# Patient Record
Sex: FEMALE | Employment: STUDENT | URBAN - METROPOLITAN AREA
[De-identification: names, ages, dates, MRNs, and addresses within clinical notes are randomized per-mention and may not be internally consistent; named-entity substitution may affect disease eponyms.]

---
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
## 2021-11-30 ENCOUNTER — HOSPITAL ENCOUNTER (OUTPATIENT)
Dept: MRI IMAGING | Age: 21
Discharge: HOME | End: 2021-11-30
Attending: PHYSICIAN ASSISTANT
Payer: COMMERCIAL

## 2021-11-30 DIAGNOSIS — H90.3 ASYMMETRIC SNHL (SENSORINEURAL HEARING LOSS): ICD-10-CM

## 2021-11-30 DIAGNOSIS — R42 DIZZINESS: ICD-10-CM

## 2021-11-30 PROCEDURE — 74011250636 HC RX REV CODE- 250/636: Performed by: PHYSICIAN ASSISTANT

## 2021-11-30 PROCEDURE — A9576 INJ PROHANCE MULTIPACK: HCPCS | Performed by: PHYSICIAN ASSISTANT

## 2021-11-30 PROCEDURE — 70553 MRI BRAIN STEM W/O & W/DYE: CPT

## 2021-11-30 RX ORDER — SODIUM CHLORIDE 0.9 % (FLUSH) 0.9 %
10 SYRINGE (ML) INJECTION
Status: COMPLETED | OUTPATIENT
Start: 2021-11-30 | End: 2021-11-30

## 2021-11-30 RX ADMIN — GADOTERIDOL 18 ML: 279.3 INJECTION, SOLUTION INTRAVENOUS at 19:54

## 2021-11-30 RX ADMIN — Medication 10 ML: at 19:54

## 2022-01-26 ENCOUNTER — HOSPITAL ENCOUNTER (OUTPATIENT)
Dept: INFUSION THERAPY | Age: 22
Discharge: HOME | End: 2022-01-26
Payer: COMMERCIAL

## 2022-01-26 VITALS
SYSTOLIC BLOOD PRESSURE: 127 MMHG | DIASTOLIC BLOOD PRESSURE: 73 MMHG | RESPIRATION RATE: 18 BRPM | OXYGEN SATURATION: 100 % | HEART RATE: 100 BPM | TEMPERATURE: 98.3 F

## 2022-01-26 PROCEDURE — 96365 THER/PROPH/DIAG IV INF INIT: CPT

## 2022-01-26 PROCEDURE — 74011250636 HC RX REV CODE- 250/636: Performed by: FAMILY MEDICINE

## 2022-01-26 PROCEDURE — 74011250636 HC RX REV CODE- 250/636: Performed by: OPHTHALMOLOGY

## 2022-01-26 RX ORDER — SODIUM CHLORIDE 9 MG/ML
25 INJECTION, SOLUTION INTRAVENOUS ONCE
Status: COMPLETED | OUTPATIENT
Start: 2022-01-26 | End: 2022-01-26

## 2022-01-26 RX ORDER — HYDROCORTISONE SODIUM SUCCINATE 100 MG/2ML
100 INJECTION, POWDER, FOR SOLUTION INTRAMUSCULAR; INTRAVENOUS AS NEEDED
Status: ACTIVE | OUTPATIENT
Start: 2022-01-26 | End: 2022-01-26

## 2022-01-26 RX ORDER — ALBUTEROL SULFATE 0.83 MG/ML
2.5 SOLUTION RESPIRATORY (INHALATION) AS NEEDED
Status: ACTIVE | OUTPATIENT
Start: 2022-01-26 | End: 2022-01-26

## 2022-01-26 RX ORDER — EPINEPHRINE 1 MG/ML
0.3 INJECTION, SOLUTION, CONCENTRATE INTRAVENOUS AS NEEDED
Status: ACTIVE | OUTPATIENT
Start: 2022-01-26 | End: 2022-01-26

## 2022-01-26 RX ORDER — ACETAMINOPHEN 325 MG/1
650 TABLET ORAL AS NEEDED
Status: ACTIVE | OUTPATIENT
Start: 2022-01-26 | End: 2022-01-26

## 2022-01-26 RX ORDER — ONDANSETRON 2 MG/ML
8 INJECTION INTRAMUSCULAR; INTRAVENOUS AS NEEDED
Status: ACTIVE | OUTPATIENT
Start: 2022-01-26 | End: 2022-01-26

## 2022-01-26 RX ORDER — DIPHENHYDRAMINE HYDROCHLORIDE 50 MG/ML
50 INJECTION, SOLUTION INTRAMUSCULAR; INTRAVENOUS AS NEEDED
Status: ACTIVE | OUTPATIENT
Start: 2022-01-26 | End: 2022-01-26

## 2022-01-26 RX ADMIN — FERRIC CARBOXYMALTOSE INJECTION 750 MG: 50 INJECTION, SOLUTION INTRAVENOUS at 09:02

## 2022-01-26 RX ADMIN — SODIUM CHLORIDE 25 ML/HR: 9 INJECTION, SOLUTION INTRAVENOUS at 09:02

## 2022-01-26 NOTE — PROGRESS NOTES
Arrived to the [facility]. Injectafer completed. Patient tolerated without problesm. Any issues or concerns during appointment: no.  Patient aware of next infusion appointment on 2/3/22 (date) at 1500 (time). Patient instructed to call provider with temperature of 100.4 or greater or nausea/vomiting/ diarrhea or pain not controlled by medications  Discharged ambulatory.

## 2022-02-03 ENCOUNTER — HOSPITAL ENCOUNTER (OUTPATIENT)
Dept: INFUSION THERAPY | Age: 22
Discharge: HOME | End: 2022-02-03
Payer: COMMERCIAL

## 2022-02-03 VITALS
SYSTOLIC BLOOD PRESSURE: 116 MMHG | TEMPERATURE: 98 F | HEART RATE: 99 BPM | OXYGEN SATURATION: 100 % | DIASTOLIC BLOOD PRESSURE: 66 MMHG | RESPIRATION RATE: 16 BRPM

## 2022-02-03 PROBLEM — R26.89 IMBALANCE: Status: ACTIVE | Noted: 2022-02-03

## 2022-02-03 PROBLEM — R42 VERTIGO: Status: ACTIVE | Noted: 2022-02-03

## 2022-02-03 PROBLEM — R51.9 CHRONIC DAILY HEADACHE: Status: ACTIVE | Noted: 2022-02-03

## 2022-02-03 PROCEDURE — 74011250636 HC RX REV CODE- 250/636: Performed by: FAMILY MEDICINE

## 2022-02-03 PROCEDURE — 96365 THER/PROPH/DIAG IV INF INIT: CPT

## 2022-02-03 RX ORDER — SODIUM CHLORIDE 0.9 % (FLUSH) 0.9 %
10 SYRINGE (ML) INJECTION AS NEEDED
Status: DISCONTINUED | OUTPATIENT
Start: 2022-02-03 | End: 2022-02-05 | Stop reason: HOSPADM

## 2022-02-03 RX ADMIN — FERRIC CARBOXYMALTOSE INJECTION 750 MG: 50 INJECTION, SOLUTION INTRAVENOUS at 15:59

## 2022-02-03 RX ADMIN — Medication 10 ML: at 16:03

## 2022-02-03 NOTE — PROGRESS NOTES
Arrived to the [facility]. Injectafer completed. Patient tolerated well. Any issues or concerns during appointment: none. Patient instructed to call provider with temperature of 100.4 or greater or nausea/vomiting/ diarrhea or pain not controlled by medications  Discharged ambulatory.

## 2022-02-23 PROBLEM — G93.5 CHIARI I MALFORMATION (HCC): Status: ACTIVE | Noted: 2022-02-23

## 2022-02-23 PROBLEM — G43.009 MIGRAINE WITHOUT AURA AND WITHOUT STATUS MIGRAINOSUS, NOT INTRACTABLE: Status: ACTIVE | Noted: 2022-02-23

## 2022-02-23 PROBLEM — G44.83 PRIMARY COUGH HEADACHE: Status: ACTIVE | Noted: 2022-02-23

## 2022-03-19 PROBLEM — G44.83 PRIMARY COUGH HEADACHE: Status: ACTIVE | Noted: 2022-02-23

## 2022-03-19 PROBLEM — G93.5 CHIARI I MALFORMATION (HCC): Status: ACTIVE | Noted: 2022-02-23

## 2022-03-19 PROBLEM — G43.009 MIGRAINE WITHOUT AURA AND WITHOUT STATUS MIGRAINOSUS, NOT INTRACTABLE: Status: ACTIVE | Noted: 2022-02-23

## 2022-03-20 PROBLEM — R26.89 IMBALANCE: Status: ACTIVE | Noted: 2022-02-03

## 2022-03-20 PROBLEM — R51.9 CHRONIC DAILY HEADACHE: Status: ACTIVE | Noted: 2022-02-03

## 2022-03-20 PROBLEM — R42 VERTIGO: Status: ACTIVE | Noted: 2022-02-03

## 2022-05-31 ENCOUNTER — TELEMEDICINE (OUTPATIENT)
Dept: NEUROLOGY | Age: 22
End: 2022-05-31
Payer: COMMERCIAL

## 2022-05-31 DIAGNOSIS — G43.009 MIXED COMMON MIGRAINE AND MUSCLE CONTRACTION HEADACHE: Primary | ICD-10-CM

## 2022-05-31 DIAGNOSIS — G44.209 MIXED COMMON MIGRAINE AND MUSCLE CONTRACTION HEADACHE: Primary | ICD-10-CM

## 2022-05-31 PROCEDURE — 99214 OFFICE O/P EST MOD 30 MIN: CPT | Performed by: PSYCHIATRY & NEUROLOGY

## 2022-05-31 RX ORDER — TIZANIDINE 2 MG/1
TABLET ORAL
Qty: 60 TABLET | Refills: 3 | Status: SHIPPED | OUTPATIENT
Start: 2022-05-31

## 2022-05-31 RX ORDER — TOPIRAMATE 50 MG/1
50 TABLET, FILM COATED ORAL 2 TIMES DAILY
COMMUNITY

## 2022-05-31 NOTE — PROGRESS NOTES
.Mary Moy (:  2000) is a Established patient, here for evaluation of the following:    Assessment & Plan   Below is the assessment and plan developed based on review of pertinent history, physical exam, labs, studies, and medications. 1. Mixed common migraine and muscle contraction headache  -     tiZANidine (ZANAFLEX) 2 MG tablet; 1 qhs, may increase to 2 qhs in 2 weeks. , Disp-60 tablet, R-3Normal  Topamax 50 mg twice a day worked well and kept her free from migraines. However, she noticed oligomenorrhea while on Topamax. She will check to her gynecologist.  We have discussed monthly CGRP blocker ajovy as a backup. She is already on antidepressant. Start Zanaflex low dosage for daily tension headaches. No follow-ups on file. Subjective   Increased topamax 50 mg bid, no migraines, but still has pressure pain, frontal and the top, 4-5/10 usually in the every evening, not taking pain killers daily anymore. BP during HA normal to mildly high 118/64, 123/87, 137/93. Sleep test done past year negative. Looking down like reading for a period of time, she would have neck pain. Noticed less amount of MC bleeding and shorter period, and missed one past month. Headache     Migraine       Review of Systems   Neurological: Positive for headaches. Objective   Patient-Reported Vitals  No data recorded     Physical Exam  Constitutional:       Appearance: She is obese. Eyes:      Extraocular Movements: Extraocular movements intact. Conjunctiva/sclera: Conjunctivae normal.      Pupils: Pupils are equal, round, and reactive to light. Pulmonary:      Effort: Pulmonary effort is normal.   Neurological:      Mental Status: She is oriented to person, place, and time. Cranial Nerves: No cranial nerve deficit. Motor: No weakness.       Coordination: Coordination normal.      Gait: Gait normal.   Psychiatric:         Mood and Affect: Mood normal.         Thought Content: Thought content normal.         Judgment: Judgment normal.       [INSTRUCTIONS:  \"[x]\" Indicates a positive item  \"[]\" Indicates a negative item  -- DELETE ALL ITEMS NOT EXAMINED]    Constitutional: [x] Appears well-developed and well-nourished [x] No apparent distress      [] Abnormal -     Mental status: [x] Alert and awake  [x] Oriented to person/place/time [x] Able to follow commands    [] Abnormal -     Eyes:   EOM    [x]  Normal    [] Abnormal -   Sclera  [x]  Normal    [] Abnormal -          Discharge [x]  None visible   [] Abnormal -     HENT: [x] Normocephalic, atraumatic  [] Abnormal -   [x]     External Ears [x] Normal hearing [] Abnormal -    Neck: [x][] Abnormal -     Pulmonary/Chest: [x] Respiratory effort normal   [x] No visualized signs of difficulty breathing or respiratory distress        [] Abnormal -      Musculoskeletal:   [x] Normal gait with no signs of ataxia         [x] Normal range of motion of neck        [] Abnormal -     Neurological:        [x] No Facial Asymmetry (Cranial nerve 7 motor function) (limited exam due to video visit)          [x] No gaze palsy        [] Abnormal -          Skin:        [x]         [] Abnormal -            Psychiatric:       [x] Normal Affect [] Abnormal -        [x] No Hallucinations    Other pertinent observable physical exam findings:-        On this date 5/31/2022 I have spent 35 minutes reviewing previous notes, test results and face to face (virtual) with the patient discussing the diagnosis and importance of compliance with the treatment plan as well as documenting on the day of the visit. Mark Mccracken, was evaluated through a synchronous (real-time) audio-video encounter. The patient (or guardian if applicable) is aware that this is a billable service, which includes applicable co-pays. This Virtual Visit was conducted with patient's (and/or legal guardian's) consent.  The visit was conducted pursuant to the emergency declaration under the [address] [facility], [facility] waiver authority and the [employer] General Act. Patient identification was verified, and a caregiver was present when appropriate. The patient was located at Home: [address] [place] [address],[address] [id number].    Provider was located at Home ([facility]): Mak Turner MD

## 2022-08-31 ENCOUNTER — OFFICE VISIT (OUTPATIENT)
Dept: OBGYN CLINIC | Age: 22
End: 2022-08-31
Payer: COMMERCIAL

## 2022-08-31 VITALS
BODY MASS INDEX: 36.16 KG/M2 | DIASTOLIC BLOOD PRESSURE: 84 MMHG | WEIGHT: 225 LBS | SYSTOLIC BLOOD PRESSURE: 116 MMHG | HEIGHT: 66 IN

## 2022-08-31 DIAGNOSIS — Z01.411 ENCOUNTER FOR GYNECOLOGICAL EXAMINATION (GENERAL) (ROUTINE) WITH ABNORMAL FINDINGS: Primary | ICD-10-CM

## 2022-08-31 DIAGNOSIS — B37.2 YEAST DERMATITIS: ICD-10-CM

## 2022-08-31 DIAGNOSIS — N91.3 PRIMARY OLIGOMENORRHEA: ICD-10-CM

## 2022-08-31 DIAGNOSIS — Z12.4 PAP SMEAR FOR CERVICAL CANCER SCREENING: ICD-10-CM

## 2022-08-31 DIAGNOSIS — N93.9 ABNORMAL UTERINE BLEEDING (AUB): ICD-10-CM

## 2022-08-31 DIAGNOSIS — E28.8 HYPERANDROGENISM: ICD-10-CM

## 2022-08-31 PROCEDURE — 99213 OFFICE O/P EST LOW 20 MIN: CPT | Performed by: OBSTETRICS & GYNECOLOGY

## 2022-08-31 PROCEDURE — 99385 PREV VISIT NEW AGE 18-39: CPT | Performed by: OBSTETRICS & GYNECOLOGY

## 2022-08-31 ASSESSMENT — PATIENT HEALTH QUESTIONNAIRE - PHQ9
SUM OF ALL RESPONSES TO PHQ9 QUESTIONS 1 & 2: 2
SUM OF ALL RESPONSES TO PHQ QUESTIONS 1-9: 2
1. LITTLE INTEREST OR PLEASURE IN DOING THINGS: 1
SUM OF ALL RESPONSES TO PHQ QUESTIONS 1-9: 2
2. FEELING DOWN, DEPRESSED OR HOPELESS: 1
SUM OF ALL RESPONSES TO PHQ QUESTIONS 1-9: 2
SUM OF ALL RESPONSES TO PHQ QUESTIONS 1-9: 2

## 2022-08-31 NOTE — PROGRESS NOTES
[address], [place] [address], [address]  [phone]    Lg Arvizu MD, Thermon Lennox, MD, Ramona Collier, [facility]    Assessment/Plan     Diagnoses and all orders for this visit:    Encounter for gynecological examination (general) (routine) with abnormal findings  Comments:  - pap done  - s/p gardasil  - self breast awareness encouraged    Primary oligomenorrhea  Comments:  - checking labs and US  - normal TSH recently  - suspect PCOS, has clinical hyperandrogenism  Orders:  -     Testosterone, free, total; Future  -     DHEA-Sulfate; Future  -     17-OH Progesterone, LC/MS; Future  -     Follicle Stimulating Hormone; Future  -     Estradiol; Future  -     Prolactin; Future    Pap smear for cervical cancer screening  -     PAP LB, Reflex HPV ASCUS ([id number]); Future  -     PAP LB, Reflex HPV ASCUS ([id number])    Yeast dermatitis  Comments:  - will try topical mycolog  Orders:  -     nystatin-triamcinolone (MYCOLOG II) 313419-5.1 UNIT/GM-% cream; Apply topically 2 times daily x 1 week on the vulva    Hyperandrogenism  Comments:  - h/o hydradenitis, acne, and hirsutism  - will check testosterone, DHEAS, 17OHP    Abnormal uterine bleeding (AUB)  Comments:  - h/o oligomenorrhea. Likely episode of anovulation causing no menses x 3 months. - will w/u with US and labs as above  - not sexually active    BMI 36.0-36.9,adult         Subjective     Guichon Jeff Dukes  25 y.o. [facility] presents today for annual Gyn exam with c/o irregular menses. New patient. She has always had irregular periods, usually q 35 days but sometimes 28-35 days. This year, gained 20 lbs. She skipped a period in Jan, Feb, and March. Period lasts 2-4 days and is heavy and painful. Has needed to be out of school. Large clots, needs to sleep on a towel bc she will bleed through clothing. Has hydradenitis and is treated with minocyclin, seen by Dr. Imani Mckay. Also h/o acne and hirsutism (plucks chin hair). Was on OCPs in HS but didn't take regularly and seemed to make bleeding worse. She only tried this for 2-3 months. H/o migraines, no aura. No personal hx of heart/liver dz. No family hx of clotting d/o. Not sexually active, virginal. Reports no libido. Wonders if this is related to her antidepressants. Has itching on external vulva and dryness. No abnormal discharge. No fishy odor. LAST PAP: never    LAST MAMMO: n/a    LMP: Patient's last menstrual period was 2022.     BIRTH CONTROL: abstinence      OB History    Para Term  AB Living   0 0 0 0 0 0   SAB IAB Ectopic Molar Multiple Live Births   0 0 0 0 0 0           Past Medical History:   Diagnosis Date    [facility]     Hidradenitis     Imbalance 2/3/2022    Iron deficiency anemia     Major depressive disorder, single episode, unspecified     Migraines     Mild asthma     Unspecified hypothyroidism             Past Surgical History:   Procedure Laterality Date    OTHER SURGICAL HISTORY  2006    Oral surgery    TONSILLECTOMY      WISDOM TOOTH EXTRACTION  2016           Family History   Problem Relation Age of Onset    Depression Mother     Anxiety Disorder Mother     Diabetes type 2  Mother     Depression Father     Anxiety Disorder Father     Diabetes type 2  Maternal Grandmother     Breast Cancer Neg Hx     Uterine Cancer Neg Hx     Colon Cancer Neg Hx     Ovarian Cancer Neg Hx            Social History     Socioeconomic History    Marital status: Single     Spouse name: Not on file    Number of children: Not on file    Years of education: Not on file    Highest education level: Not on file   Occupational History    Not on file   Tobacco Use    Smoking status: Never    Smokeless tobacco: Never   Substance and Sexual Activity    Alcohol use: No    Drug use: Never    Sexual activity: Never   Other Topics Concern    Not on file   Social History Narrative    Abuse: Feels safe at home, no history of physical abuse, no Neurologic:  no focal deficits,normal coordination, muscle strength and tone     Skin:   intact without lesions or rashes     Cervical Nodes:  no significant adenopathy     Axillary Nodes:   no significant adenopathy     Psych:  alert and cooperative; normal mood and affect; normal attention span and concentration      Cesar Mujica MD   4:00 PM  08/31/22

## 2022-08-31 NOTE — PROGRESS NOTES
Pt is here today to discuss irregular periods. Menses Q month usually, but did not get period in Jan, Feb, and March 2022. Since April, Q 35 days, lasting 2 days, heavy those 2 days. Menarche age 8. Pt was on birth control pills in 2018 to help regulate her periods but states it did not help. Pt has gained 20 lbs this year. Hx hypothyroidism, managed by PCP. States she has been on the same dose of levothyroxine for the last year. Pt reports acne and hair growth. Hx hidradenitis, requiring surgery. Currently on minocycline. LAST PAP:  never    LAST MAMMO:  never    LMP:  Patient's last menstrual period was 08/16/2022.     BIRTH CONTROL:  none      FAMILY HISTORY OF:   Breast Cancer:  No   Ovarian Cancer:  No   Uterine Cancer:  No   Colon Cancer: No    Vitals:    08/31/22 1328   BP: 116/84   Site: Left Upper Arm   Position: Sitting   Weight: 225 lb (102.1 kg)   Height: 5' 6\" (1.676 m)       Aparna Cao NP, APRN - CNP  08/31/22  1:39 PM

## 2022-09-01 LAB
FSH SERPL-ACNC: 4.6 MIU/ML
PROLACTIN SERPL-MCNC: 16.4 NG/ML

## 2022-09-02 LAB
CYTOLOGIST CVX/VAG CYTO: NORMAL
CYTOLOGY CVX/VAG DOC THIN PREP: NORMAL
ESTRADIOL SERPL-MCNC: 74 PG/ML
HPV REFLEX: NORMAL
Lab: NORMAL
PATH REPORT.FINAL DX SPEC: NORMAL
STAT OF ADQ CVX/VAG CYTO-IMP: NORMAL

## 2022-09-04 LAB — 17OHP SERPL-MCNC: 68 NG/DL

## 2022-09-06 LAB
TESTOST FREE SERPL-MCNC: 1.3 PG/ML (ref 0–4.2)
TESTOST SERPL-MCNC: 5 NG/DL (ref 13–71)

## 2022-09-07 ENCOUNTER — OFFICE VISIT (OUTPATIENT)
Dept: OBGYN CLINIC | Age: 22
End: 2022-09-07
Payer: COMMERCIAL

## 2022-09-07 VITALS
WEIGHT: 229 LBS | BODY MASS INDEX: 36.8 KG/M2 | DIASTOLIC BLOOD PRESSURE: 80 MMHG | SYSTOLIC BLOOD PRESSURE: 120 MMHG | HEIGHT: 66 IN

## 2022-09-07 DIAGNOSIS — E28.2 PCOS (POLYCYSTIC OVARIAN SYNDROME): ICD-10-CM

## 2022-09-07 DIAGNOSIS — N91.3 PRIMARY OLIGOMENORRHEA: Primary | ICD-10-CM

## 2022-09-07 DIAGNOSIS — N93.9 ABNORMAL UTERINE BLEEDING (AUB): ICD-10-CM

## 2022-09-07 PROCEDURE — 99214 OFFICE O/P EST MOD 30 MIN: CPT | Performed by: OBSTETRICS & GYNECOLOGY

## 2022-09-07 PROCEDURE — 76856 US EXAM PELVIC COMPLETE: CPT | Performed by: OBSTETRICS & GYNECOLOGY

## 2022-09-07 RX ORDER — DESOGESTREL AND ETHINYL ESTRADIOL 0.15-0.03
1 KIT ORAL DAILY
Qty: 1 PACKET | Refills: 3 | Status: SHIPPED | OUTPATIENT
Start: 2022-09-07

## 2022-09-07 ASSESSMENT — PATIENT HEALTH QUESTIONNAIRE - PHQ9
SUM OF ALL RESPONSES TO PHQ QUESTIONS 1-9: 0
SUM OF ALL RESPONSES TO PHQ QUESTIONS 1-9: 0
2. FEELING DOWN, DEPRESSED OR HOPELESS: 0
SUM OF ALL RESPONSES TO PHQ QUESTIONS 1-9: 0
1. LITTLE INTEREST OR PLEASURE IN DOING THINGS: 0
SUM OF ALL RESPONSES TO PHQ9 QUESTIONS 1 & 2: 0
SUM OF ALL RESPONSES TO PHQ QUESTIONS 1-9: 0

## 2022-09-07 NOTE — PROGRESS NOTES
Patient comes in today for gyn follow up with ultrasound. LAST PAP:  08/31/2022, Negative    LAST MAMMO:  Never    LMP:  Patient's last menstrual period was 08/16/2022.     BIRTH CONTROL:  abstinence    TOBACCO USE:  No    FAMILY HISTORY OF:   Breast Cancer:  No   Ovarian Cancer:  No   Uterine Cancer:  No   Colon Cancer:  No    Vitals:    09/07/22 1506   BP: 120/80   Site: Left Upper Arm   Position: Sitting   Weight: 229 lb (103.9 kg)   Height: 5' 6\" (1.676 m)        Ashleigh Kaba MA  09/07/22  3:10 PM

## 2022-09-07 NOTE — PROGRESS NOTES
[address], [place] [address], [address]  [phone]    Anitra Nuñez MD, Meka Alcazar MD, OBIE Dugan-BC  Assessment/Plan     Vik Shah was seen today for follow-up and ultrasound. Diagnoses and all orders for this visit:    Primary oligomenorrhea  -     AMB POC US, PELVIC COMPLETE  -     desogestrel-ethinyl estradiol (DESOGEN) 0.15-30 MG-MCG per tablet; Take 1 tablet by mouth daily    PCOS (polycystic ovarian syndrome)  Comments:  - Meets [place] criteria for PCOS (oligomenorrhea, clinical hyperandrogenism, PCO ovary)  - DIscussed implications, she is agreeable to OCPs for cycle control, no CI to combined hormones. Risk of VTE and warning signs discussed. Has migraines, no aura. - Rx desogen given, will fu in 3-4 months to see how she is doing on new medication.   - asked if she needs to see an endocrinologist - discussed she meets criteria for PCOS and other hormones wnl (testosterone low but [address] low/estrogen high not concerning for hypogonadism, TSH wnl, prolactin wnl). Her internist also did not feel she needed to see endo. Abnormal uterine bleeding (AUB)  Comments:  - likely episode of anovulatory bleeding   Orders:  -     AMB POC US, PELVIC COMPLETE         Subjective     Mark Mensah Stacks 25 y.o. Heidi Jones presents today for a gyn problem of AUB (likely anovulatory bleeding) and a long hx of oligomenorrhea. Initially seen on 8/31/22 and reported h/o irreghular menses usually q 35 days but skipped x 3 months this past year. Having heavy painful periods. H/o hydradenitis and treated with abx by Dr. [facility]. Also h/o acne and hirsutism. Virginal.     Last visit given mycolog for itching on vulva and dryness, she has not filled this Rx yet.     Labs: 8/31/22  Testosterone 5, Free T 1.3  17OHP 68  FSH 4.6  E2 74  Prolactin 16.4  DHEAS P  TSH 4.504 (7/6/22)     Ultrasound today:   UTERUS IS ANTEVERTED AND APPEARS NORMAL WITHIN THE LIMITS OF TRANSABDOMINAL ULTRASOUND   ENDO APPEARS WNL   RIGHT OV APPEARS WNL- 10.506 cm3   LEFT OV APPEARS WNL- 8.818 cm3   NO FREE FLUID       LAST PAP: 22 neg    LAST MAMMO: N/A    LMP: Patient's last menstrual period was 2022.     BIRTH CONTROL: abstinence (virginal)      OB History    Para Term  AB Living   0 0 0 0 0 0   SAB IAB Ectopic Molar Multiple Live Births   0 0 0 0 0 0           Past Medical History:   Diagnosis Date    Chiari I malformation ([address]     Hidradenitis     Imbalance 2/3/2022    Iron deficiency anemia     Major depressive disorder, single episode, unspecified     Migraine 2019    Migraines     Mild asthma     Unspecified hypothyroidism             Past Surgical History:   Procedure Laterality Date    OTHER SURGICAL HISTORY  2006    Oral surgery    TONSILLECTOMY      WISDOM TOOTH EXTRACTION  2016           Family History   Problem Relation Age of Onset    Depression Mother     Anxiety Disorder Mother     Diabetes type 2  Mother     Mental Illness Mother     Preeclampsia Mother     Depression Father     Anxiety Disorder Father     Mental Illness Father     Diabetes type 2  Maternal Grandmother     Diabetes Maternal Grandmother     Breast Cancer Neg Hx     Uterine Cancer Neg Hx     Colon Cancer Neg Hx     Ovarian Cancer Neg Hx            Social History     Socioeconomic History    Marital status: Single     Spouse name: Not on file    Number of children: Not on file    Years of education: Not on file    Highest education level: Not on file   Occupational History    Not on file   Tobacco Use    Smoking status: Never    Smokeless tobacco: Never   Substance and Sexual Activity    Alcohol use: No    Drug use: Never    Sexual activity: Never   Other Topics Concern    Not on file   Social History Narrative    Abuse: Feels safe at home, no history of physical abuse, no history of sexual abuse     Social Determinants of Health     Financial Resource Strain: Not on file   Food Insecurity: Not on file   Transportation Needs: Not on file   Physical Activity: Not on file   Stress: Not on file   Social Connections: Not on file   Intimate Partner Violence: Not on file   Housing Stability: Not on file           No Known Allergies      Review of Systems    Constitutional:  No fevers or chills    Neuro:  No headaches, seizure activity    HEENT: no visual changes, bleeding gums    CV: No chest pain or palpitations    Resp: No SOB or cough    Breast:  No masses, pain, D/C, bleeding    GI:  No nausea/vomiting/diarrhea/constipation    : No dysuria or hematuria    MS:  No back, joint pain    Gyn:  As per HPI    Psych:  No depression, anxiety      Objective       Vitals:    09/07/22 1506   BP: 120/80         Physical Exam    General:  well developed, well nourished, in no acute distress     Head:   normocephalic and atraumatic     Msk:   no deformity or scoliosis noted with normal posture and gait     Skin:   intact without lesions or rashes     Psych:  alert and cooperative; normal mood and affect; normal attention span and concentration        Monique Beltran MD   10:16 AM  09/08/22

## 2022-09-08 PROBLEM — E28.2 PCOS (POLYCYSTIC OVARIAN SYNDROME): Status: ACTIVE | Noted: 2022-09-08

## 2022-09-18 LAB — DHEA-S SERPL-MCNC: NORMAL UG/DL

## 2022-09-22 ENCOUNTER — TELEPHONE (OUTPATIENT)
Dept: OBGYN CLINIC | Age: 22
End: 2022-09-22

## 2022-09-23 NOTE — TELEPHONE ENCOUNTER
Attempted to contact patient but call went straight to . [facility] for patient to call the office. No details given.  mc

## 2022-10-04 ENCOUNTER — TELEMEDICINE (OUTPATIENT)
Dept: NEUROLOGY | Age: 22
End: 2022-10-04
Payer: COMMERCIAL

## 2022-10-04 DIAGNOSIS — N91.3 PRIMARY OLIGOMENORRHEA: ICD-10-CM

## 2022-10-04 DIAGNOSIS — G93.5 CHIARI I MALFORMATION (HCC): ICD-10-CM

## 2022-10-04 DIAGNOSIS — G43.009 MIXED COMMON MIGRAINE AND MUSCLE CONTRACTION HEADACHE: Primary | ICD-10-CM

## 2022-10-04 DIAGNOSIS — G44.209 MIXED COMMON MIGRAINE AND MUSCLE CONTRACTION HEADACHE: Primary | ICD-10-CM

## 2022-10-04 DIAGNOSIS — G43.009 MIGRAINE WITHOUT AURA, NOT INTRACTABLE, WITHOUT STATUS MIGRAINOSUS: ICD-10-CM

## 2022-10-04 PROCEDURE — 99213 OFFICE O/P EST LOW 20 MIN: CPT | Performed by: PSYCHIATRY & NEUROLOGY

## 2022-10-04 RX ORDER — DESOGESTREL AND ETHINYL ESTRADIOL 0.15-0.03
1 KIT ORAL DAILY
Qty: 1 PACKET | Refills: 3 | OUTPATIENT
Start: 2022-10-04

## 2022-10-04 RX ORDER — RIZATRIPTAN BENZOATE 10 MG/1
TABLET ORAL
Qty: 10 TABLET | Refills: 5 | Status: SHIPPED | OUTPATIENT
Start: 2022-10-04

## 2022-10-04 NOTE — PROGRESS NOTES
Mark Simpson (:  2000) is a Established patient, here for evaluation of the following:    Assessment & Plan   Below is the assessment and plan developed based on review of pertinent history, physical exam, labs, studies, and medications. 1. Mixed common migraine and muscle contraction headache  -     MRI BRAIN W WO CONTRAST; Future  2. Migraine without aura, not intractable, without status migrainosus  -     rizatriptan (MAXALT) 10 MG tablet; [email or web address], May repeat in 2 hours if needed, max 2 tabs/ 24 hrs., Disp-10 tablet, R-5Normal  3. Chiari I malformation ([address]  -     MRI BRAIN W WO CONTRAST; Future    Daily HA reduced to 5/mo with topamax 50 mg bid. Continue topamax and start maxalt for acute migraines. MRI brain to   follow up Chiari, if unchanged will extend intervals. Return in about 6 months (around 2023). Subjective   Headaches improved, was daily, pressure pain when laughing or coughing, muscle relaxant sleepy side effect. Tolerated topamax well, migraine 5/mo,moderate, takes ibuprofen 2-3 tabs, or tough through. BP high sided. No palpitation. Review of Systems   Neurological:  Positive for headaches.          Objective   Patient-Reported Vitals  No data recorded     Physical Exam  [INSTRUCTIONS:  \"[x]\" Indicates a positive item  \"[]\" Indicates a negative item  -- DELETE ALL ITEMS NOT EXAMINED]    Constitutional: [x] Appears well-developed and well-nourished [x] No apparent distress      [] Abnormal -     Mental status: [x] Alert and awake  [x] Oriented to person/place/time [x] Able to follow commands, normal speech    [] Abnormal -     Eyes:   EOM    [x]  Normal    [] Abnormal -   Sclera  [x]  Normal    [] Abnormal -          Discharge [x]  None visible   [] Abnormal -     HENT: [x] Normocephalic, atraumatic  [] Abnormal -   [x]     External Ears [x] Normal hearing [] Abnormal -    Neck: [x]  [] Abnormal -     Pulmonary/Chest: [x] Respiratory effort normal   [x] No visualized signs of difficulty breathing or respiratory distress        [] Abnormal -      Musculoskeletal:   [x] Normal gait with no signs of ataxia         [x] Normal range of motion of neck        [] Abnormal -     Neurological:        [x] No Facial Asymmetry (Cranial nerve 7 motor function) (limited exam due to video visit)          [x] No gaze palsy        [] Abnormal -          Skin:        [x]          [] Abnormal -            Psychiatric:       [x] Normal Affect [] Abnormal -        [x] No Hallucinations    Other pertinent observable physical exam findings:-         On this date 10/4/2022 I have spent 20 minutes reviewing previous notes, test results and face to face (virtual) with the patient discussing the diagnosis and importance of compliance with the treatment plan as well as documenting on the day of the visit. Mark Gurrola, was evaluated through a synchronous (real-time) audio-video encounter. The patient (or guardian if applicable) is aware that this is a billable service, which includes applicable co-pays. This Virtual Visit was conducted with patient's (and/or legal guardian's) consent. The visit was conducted pursuant to the emergency declaration under the [address] [place] [address], [address] [place] [address] authority and the [employer] Act. Patient identification was verified, and a caregiver was present when appropriate. The patient was located at Other: car not driving .    Provider was located at Home ([address]): Craisa Narayan MD

## 2022-10-18 ENCOUNTER — HOSPITAL ENCOUNTER (OUTPATIENT)
Dept: MRI IMAGING | Age: 22
Discharge: HOME | End: 2022-10-21
Payer: COMMERCIAL

## 2022-10-18 DIAGNOSIS — G43.009 MIXED COMMON MIGRAINE AND MUSCLE CONTRACTION HEADACHE: ICD-10-CM

## 2022-10-18 DIAGNOSIS — G93.5 CHIARI I MALFORMATION (HCC): ICD-10-CM

## 2022-10-18 DIAGNOSIS — G44.209 MIXED COMMON MIGRAINE AND MUSCLE CONTRACTION HEADACHE: ICD-10-CM

## 2022-10-18 PROCEDURE — 2580000003 HC RX 258: Performed by: FAMILY MEDICINE

## 2022-10-18 PROCEDURE — A9579 GAD-BASE MR CONTRAST NOS,1ML: HCPCS | Performed by: FAMILY MEDICINE

## 2022-10-18 PROCEDURE — 6360000004 HC RX CONTRAST MEDICATION: Performed by: FAMILY MEDICINE

## 2022-10-18 PROCEDURE — 70553 MRI BRAIN STEM W/O & W/DYE: CPT

## 2022-10-18 RX ORDER — SODIUM CHLORIDE 0.9 % (FLUSH) 0.9 %
10 SYRINGE (ML) INJECTION AS NEEDED
Status: DISCONTINUED | OUTPATIENT
Start: 2022-10-18 | End: 2022-10-22 | Stop reason: HOSPADM

## 2022-10-18 RX ADMIN — SODIUM CHLORIDE, PRESERVATIVE FREE 10 ML: 5 INJECTION INTRAVENOUS at 18:51

## 2022-10-18 RX ADMIN — GADOTERIDOL 20 ML: 279.3 INJECTION, SOLUTION INTRAVENOUS at 18:50

## 2022-11-07 ENCOUNTER — APPOINTMENT (OUTPATIENT)
Dept: GENERAL RADIOLOGY | Age: 22
End: 2022-11-07
Payer: COMMERCIAL

## 2022-11-07 ENCOUNTER — HOSPITAL ENCOUNTER (EMERGENCY)
Age: 22
Discharge: HOME | End: 2022-11-07
Attending: EMERGENCY MEDICINE
Payer: COMMERCIAL

## 2022-11-07 VITALS
OXYGEN SATURATION: 100 % | HEIGHT: 66 IN | DIASTOLIC BLOOD PRESSURE: 80 MMHG | TEMPERATURE: 98.8 F | BODY MASS INDEX: 35.36 KG/M2 | SYSTOLIC BLOOD PRESSURE: 130 MMHG | WEIGHT: 220 LBS | HEART RATE: 95 BPM | RESPIRATION RATE: 18 BRPM

## 2022-11-07 DIAGNOSIS — R05.1 ACUTE COUGH: Primary | ICD-10-CM

## 2022-11-07 PROCEDURE — 71045 X-RAY EXAM CHEST 1 VIEW: CPT

## 2022-11-07 PROCEDURE — 99283 EMERGENCY DEPT VISIT LOW MDM: CPT

## 2022-11-07 RX ORDER — GUAIFENESIN/DEXTROMETHORPHAN 100-10MG/5
5 SYRUP ORAL 3 TIMES DAILY PRN
Qty: 45 ML | Refills: 0 | Status: SHIPPED | OUTPATIENT
Start: 2022-11-07 | End: 2022-11-07 | Stop reason: SDUPTHER

## 2022-11-07 RX ORDER — GUAIFENESIN/DEXTROMETHORPHAN 100-10MG/5
5 SYRUP ORAL 3 TIMES DAILY PRN
Qty: 45 ML | Refills: 0 | Status: SHIPPED | OUTPATIENT
Start: 2022-11-07 | End: 2022-11-17

## 2022-11-07 ASSESSMENT — PAIN - FUNCTIONAL ASSESSMENT
PAIN_FUNCTIONAL_ASSESSMENT: 0-10
PAIN_FUNCTIONAL_ASSESSMENT: 0-10

## 2022-11-07 ASSESSMENT — PAIN DESCRIPTION - LOCATION
LOCATION: HEAD
LOCATION: HEAD

## 2022-11-07 ASSESSMENT — ENCOUNTER SYMPTOMS
SORE THROAT: 0
BACK PAIN: 0
SHORTNESS OF BREATH: 0
VOMITING: 0
COUGH: 1
ABDOMINAL PAIN: 0
NAUSEA: 0

## 2022-11-07 ASSESSMENT — PAIN SCALES - GENERAL: PAINLEVEL_OUTOF10: 5

## 2022-11-07 NOTE — ED NOTES
I have reviewed discharge instructions with the patient. The patient verbalized understanding. Patient left ED via Discharge Method: ambulatory to Home with grandma    Opportunity for questions and clarification provided. Patient given 1 scripts. To continue your aftercare when you leave the hospital, you may receive an automated call from our care team to check in on how you are doing. This is a free service and part of our promise to provide the best care and service to meet your aftercare needs.  If you have questions, or wish to unsubscribe from this service please call [phone]. Thank you for Choosing our [facility] Emergency Department.         Jeanette Malone RN  11/07/22 6470

## 2022-11-07 NOTE — ED TRIAGE NOTES
\"I have the flu on Friday and I don't feel well. I have chiari and I have ICP.  Since i've been coughing a lot it's given me a headache\"

## 2022-11-07 NOTE — ED PROVIDER NOTES
[facility] Emergency Department Provider Note                   PCP:                Richard Campbell MD               Age: 25 y.o. Sex: female       ICD-10-CM    1. Acute cough  R05.1           DISPOSITION Decision To Discharge 11/07/2022 02:56:49 PM         Orders Placed This Encounter   Procedures    XR CHEST Donald Quintanilla is a 25 y.o. female who presents to the Emergency Department with chief complaint of    Chief Complaint   Patient presents with    Influenza    Headache      Patient is a 66-year-old female with past medical history significant for Arnold-Chiari malformation type I, she presents this department chief complaint of cough. She states she was diagnosed with flu last week. She was given a cough medication which she has not yet used, also was given steroids by urgent care last week. She reports continued cough. She states the cough is now aggravating her headache secondary to her Chiari malformation. She had an MRI performed about 1 month ago, showed stable malformation with no acute or new findings. She is no other medical complaints. She denies visual changes, chest pain, nausea or vomiting. The history is provided by the patient. No  was used. Review of Systems   Constitutional:  Negative for chills and fever. HENT:  Negative for congestion and sore throat. Respiratory:  Positive for cough. Negative for shortness of breath. Cardiovascular:  Negative for chest pain and palpitations. Gastrointestinal:  Negative for abdominal pain, nausea and vomiting. Genitourinary:  Negative for dysuria and vaginal discharge. Musculoskeletal:  Negative for back pain. Skin:  Negative for wound. Neurological:  Positive for headaches. Psychiatric/Behavioral:  Negative for agitation. All other systems reviewed and are negative.     Past Medical History:   Diagnosis Date    [facility]ari I malformation ([facility]     Hidradenitis     Imbalance 2/3/2022    Iron deficiency anemia     Major depressive disorder, single episode, unspecified     Migraine 2019    Migraines     Mild asthma     Unspecified hypothyroidism         Past Surgical History:   Procedure Laterality Date    OTHER SURGICAL HISTORY  2006    Oral surgery    TONSILLECTOMY      WISDOM TOOTH EXTRACTION  2016        Family History   Problem Relation Age of Onset    Depression Mother     Anxiety Disorder Mother     Diabetes type 2  Mother     Mental Illness Mother     Preeclampsia Mother     Depression Father     Anxiety Disorder Father     Mental Illness Father     Diabetes type 2  Maternal Grandmother     Diabetes Maternal Grandmother     Breast Cancer Neg Hx     Uterine Cancer Neg Hx     Colon Cancer Neg Hx     Ovarian Cancer Neg Hx         Social History     Socioeconomic History    Marital status: Single     Spouse name: None    Number of children: None    Years of education: None    Highest education level: None   Tobacco Use    Smoking status: Never    Smokeless tobacco: Never   Substance and Sexual Activity    Alcohol use: No    Drug use: Never    Sexual activity: Never   Social History Narrative    Abuse: Feels safe at home, no history of physical abuse, no history of sexual abuse         Patient has no known allergies.      Discharge Medication List as of 11/7/2022  3:17 PM        CONTINUE these medications which have NOT CHANGED    Details   rizatriptan (MAXALT) 10 MG tablet [email or web address], May repeat in 2 hours if needed, max 2 tabs/ 24 hrs., Disp-10 tablet, R-5Normal      desogestrel-ethinyl estradiol (DESOGEN) 0.15-30 MG-MCG per tablet Take 1 tablet by mouth daily, Disp-1 packet, R-3Normal      nystatin-triamcinolone (MYCOLOG II) 964892-3.1 UNIT/GM-% cream Apply topically 2 times daily x 1 week on the vulva, Disp-30 g, R-0, Normal      topiramate (TOPAMAX) 50 MG tablet Take 50 mg by mouth 2 times dailyHistorical Med      tiZANidine (ZANAFLEX) 2 MG tablet 1 qhs, may increase to 2 qhs in 2 weeks., Disp-60 tablet, R-3Normal      albuterol sulfate  (90 Base) MCG/ACT inhaler Inhale 1 puff into the lungs every 4 hours as neededHistorical Med      buPROPion (WELLBUTRIN SR) 150 MG extended release tablet Take 300 mg by mouth dailyHistorical Med      desvenlafaxine succinate (PRISTIQ) 25 MG TB24 extended release tablet 100 mg dailyHistorical Med      levothyroxine (SYNTHROID) 112 MCG tablet TAKE 1 TAB BY MOUTH DAILY (BEFORE BREAKFAST). Historical Med      minocycline (MINOCIN;DYNACIN) 50 MG capsule Take 50 mg by mouth 2 times dailyHistorical Med      omeprazole (PRILOSEC) 20 MG delayed release capsule TAKE 1 CAP BY MOUTH DAILY. Historical Med              Vitals signs and nursing note reviewed. Patient Vitals for the past 4 hrs:   Temp Pulse Resp BP SpO2   11/07/22 1520 -- 95 18 130/80 100 %   11/07/22 1219 98.8 °F (37.1 °C) 95 17 133/80 100 %          Physical Exam  Vitals and nursing note reviewed. Constitutional:       General: She is not in acute distress. Appearance: Normal appearance. She is not ill-appearing, toxic-appearing or diaphoretic. HENT:      Head: Normocephalic and atraumatic. Nose: Nose normal.      Mouth/Throat:      Mouth: Mucous membranes are moist.   Eyes:      Pupils: Pupils are equal, round, and reactive to light. Cardiovascular:      Rate and Rhythm: Normal rate. Pulmonary:      Effort: Pulmonary effort is normal. No respiratory distress. Abdominal:      General: Abdomen is flat. Palpations: Abdomen is soft. Tenderness: There is no abdominal tenderness. Musculoskeletal:         General: Normal range of motion. Cervical back: Normal range of motion. No rigidity. Skin:     General: Skin is warm. Neurological:      General: No focal deficit present. Mental Status: She is alert.    Psychiatric:         Mood and Affect: Mood normal.        MDM     Amount and/or Complexity of Data Reviewed  Tests in the radiology section of CPT®: ordered and reviewed  Independent visualization of images, tracings, or specimens: yes    Risk of Complications, Morbidity, and/or Mortality  Presenting problems: moderate  Diagnostic procedures: low  Management options: low    Patient Progress  Patient progress: stable      Procedures      Labs Reviewed - No data to display     XR CHEST PORTABLE   Final Result   No consolidation. Voice dictation software was used during the making of this note. This software is not perfect and grammatical and other typographical errors may be present. This note has not been completely proofread for errors.      MONALISA Stephens  11/07/22 1554

## 2022-11-07 NOTE — DISCHARGE INSTRUCTIONS
Chest x-ray clear. No focal findings. I have given you a cough medication. Use this sparingly. Follow-up with your primary care physician.

## 2022-11-08 ENCOUNTER — CARE COORDINATION (OUTPATIENT)
Dept: OTHER | Facility: CLINIC | Age: 22
End: 2022-11-08

## 2022-11-08 RX ORDER — OSELTAMIVIR PHOSPHATE 30 MG/1
30 CAPSULE ORAL 2 TIMES DAILY
COMMUNITY

## 2022-11-08 NOTE — CARE COORDINATION
[address] [facility] ED Follow Up Call    2022    Patient: Ritika Iglesias Patient : 2000   MRN: [id number]  Reason for Admission: cough  Discharge Date: 2022      Ambulatory Care Manager [facility]) contacted the patient by telephone to introduced the Associate Care Management Program r/t ED visit for cough. Verified name and  with patient as identifiers. Patient was agreeable Care Coordination, ACM reviewed and updated CC Protocol, medications, goals, education and disease specific assessment. Pt reports ongoing and worsening headache since discharge from ED. Pt messaged Neurologist and waiting return call/MyChart. Pt has Chiari I Malformation and neurology follows for this dx. Pt reports testing positive for the flu on Friday, . Pt cough is making headache worse and she reports \"trying not to cough\". Pt given order for Robitussin in ED and she reports she has medication at home and has been taking with no relief. Pt taking Topamax with no relief. Pt reports being out of Maxalt and plans to get filled but that it does not help much. Pt also took muscle relaxer last night but it makes her too tired to take during the day. Pt also reports taking Tamiflu. Offered to reach Neurologist but pt reports team is good about returning call with a few hours. Will route this encounter to Neurologist but if no response will call office around 1600. Pt agreeable to follow up contact from [facility]. Pt is in school Monday to Friday. Provided Education:  Discussed red flags and appropriate site of care based on symptoms and resources available to patient including: PCP  Specialist  When to call [phone]. Importance and benefits of: Follow up with PCP and specialist, medication adherence, self monitoring and reporting of symptoms. Plan:  Continue bi-weekly outreaches to provide telephonic support, education and resources as needed.    Discuss / follow up on: Goal progress, neurology follow up, medications filled, headache and cough symptoms, review all medications, and any arising needs     Pt verbalized understanding and is agreeable to follow up contact. Challenges to be reviewed by the provider   Additional needs identified to be addressed with provider Yes  Worsening headache                    Care Transitions ED Follow Up    Care Transitions Interventions  Schedule Follow Up Appointment with Physician: Raphael Nowak you have any ongoing symptoms?: Yes   Onset of Patient-reported symptoms:  In the past 7 days   Patient-reported symptoms: Other, Cough (Comment: Headache)   Did you call your PCP prior to going to the ED?: No - Did not call PCP   Are there any other complaints/concerns that you wish to tell your provider?: Headache unrelieved and worsening    Do you have a copy of your discharge instructions?: Yes   Do you understand what to report and when to return?: Yes   Are you following your discharge instructions?: Yes   Do you have all of your prescriptions and are they filled?: No   Have you scheduled your follow up appointment?: No   Were you discharged with any Home Care or Post Acute Services or do you currently have any active services?: No         Do you have any needs or concerns that I can assist you with?: Yes   Identified Barriers: Stress          Education Documentation  Educate reporting changes in condition, taught by Gregg Chapman RN at 11/8/2022 12:13 PM.  Learner: Patient  Readiness: Acceptance  Method: Explanation  Response: Verbalizes Understanding  Comment: DIscussed discharge instructions and ongoing concerns    Educate Patient on When to Call for Symptoms, taught by Gregg Chapman RN at 11/8/2022 12:13 PM.  Learner: Patient  Readiness: Acceptance  Method: Explanation  Response: Verbalizes Understanding  Comment: DIscussed discharge instructions and ongoing concerns    medication nonadherence risks, taught by Gregg Chapman RN at 11/8/2022 12:13 PM.  Learner: Patient  Readiness: Acceptance  Method: Explanation  Response: Verbalizes Understanding  Comment: DIscussed discharge instructions and ongoing concerns    Discharge Medications, taught by Denae Corona RN at 11/8/2022 12:13 PM.  Learner: Patient  Readiness: Acceptance  Method: Explanation  Response: Verbalizes Understanding  Comment: DIscussed discharge instructions and ongoing concerns    General medication information, taught by Denae Corona RN at 11/8/2022 12:13 PM.  Learner: Patient  Readiness: Acceptance  Method: Explanation  Response: Verbalizes Understanding  Comment: DIscussed discharge instructions and ongoing concerns    Education Comments  No comments found. Goals        Conditions and Symptoms      I will schedule office visits, as directed by my provider. I will keep my appointment or reschedule if I have to cancel. I will notify my provider of any barriers to my plan of care. I will notify my provider of any symptoms that indicate a worsening of my condition. Barriers: overwhelmed by complexity of regimen and stress  Plan for overcoming my barriers: working with PCP, specialist, and ACM  Confidence: 8/10  Anticipated Goal Completion Date: 12/8/2022                  Ambulatory Care Coordination Assessment    Care Coordination Protocol  Referral from Primary Care Provider: No  Week 1 - Initial Assessment     Do you have all of your prescriptions and are they filled?: No  Barriers to medication adherence: Complexity of regimen, Does not understand need for medication  Are you able to afford your medications?: Yes  How often do you have trouble taking your medications the way you have been told to take them?: Sometimes I take them as prescribed. Suggested Interventions and Community Resources         Set up/Review an Education Plan, Set up/Review Goals              Prior to Admission medications    Medication Sig Start Date End Date Taking? Authorizing Provider   oseltamivir (TAMIFLU) 30 MG capsule Take 30 mg by mouth 2 times daily   Yes Historical Provider, MD   topiramate (TOPAMAX) 50 MG tablet Take 50 mg by mouth 2 times daily   Yes Historical Provider, MD   tiZANidine (ZANAFLEX) 2 MG tablet 1 qhs, may increase to 2 qhs in 2 weeks. 5/31/22  Yes MD cheli JansenaiFENesin-dextromethorphan [facility] DM) 100-10 MG/5ML syrup Take 5 mLs by mouth 3 times daily as needed for Cough 11/7/22 11/17/22  MONALISA Ramos   rizatriptan (MAXALT) 10 MG tablet [employer], May repeat in 2 hours if needed, max 2 tabs/ 24 hrs. Patient not taking: Reported on 11/8/2022 10/4/22   Elizabeth Myers MD   desogestrel-ethinyl estradiol (DESOGEN) 0.15-30 MG-MCG per tablet Take 1 tablet by mouth daily 9/7/22   Michelle Queen MD   nystatin-triamcinolone [facility]) 012218-6.1 UNIT/GM-% cream Apply topically 2 times daily x 1 week on the vulva  Patient not taking: No sig reported 8/31/22   Michelle Queen MD   albuterol sulfate  (90 Base) MCG/ACT inhaler Inhale 1 puff into the lungs every 4 hours as needed 3/22/17   Ar Automatic Reconciliation   buPROPion (WELLBUTRIN SR) 150 MG extended release tablet Take 300 mg by mouth daily 4/11/18   Ar Automatic Reconciliation   desvenlafaxine succinate (PRISTIQ) 25 MG TB24 extended release tablet 100 mg daily 2/28/18   Ar Automatic Reconciliation   levothyroxine (SYNTHROID) 112 MCG tablet TAKE 1 TAB BY MOUTH DAILY (BEFORE BREAKFAST). 10/5/18   Ar Automatic Reconciliation   minocycline (MINOCIN;DYNACIN) 50 MG capsule Take 50 mg by mouth 2 times daily 10/5/18   Ar Automatic Reconciliation   omeprazole (PRILOSEC) 20 MG delayed release capsule TAKE 1 CAP BY MOUTH DAILY.  10/5/18   Ar Automatic Reconciliation       Future Appointments   Date Time Provider Sulema Sifuentes   12/13/2022 10:30 AM Michelle Queen MD [facility]   4/6/2023  2:00 PM Elizabeth Myers MD [facility] (2-RH) Neurology -

## 2022-11-09 ENCOUNTER — CARE COORDINATION (OUTPATIENT)
Dept: OTHER | Facility: CLINIC | Age: 22
End: 2022-11-09

## 2022-11-09 NOTE — CARE COORDINATION
No  Suggested Interventions and Community Resources          Goals Addressed                   This Visit's Progress     Conditions and Symptoms        I will schedule office visits, as directed by my provider. I will keep my appointment or reschedule if I have to cancel. I will notify my provider of any barriers to my plan of care. I will notify my provider of any symptoms that indicate a worsening of my condition.     Barriers: overwhelmed by complexity of regimen and stress  Plan for overcoming my barriers: working with PCP, specialist, and ACM  Confidence: 8/10  Anticipated Goal Completion Date: 12/8/2022 11/9/2022 assisted pt with contact to PCP for symptom management               Future Appointments   Date Time Provider Sulema Sifuentes   12/13/2022 10:30 AM Mo Leone MD [facility]   4/6/2023  2:00 PM Estiven Masterson MD [facility] (2-) Neurology -

## 2022-11-11 ENCOUNTER — TELEMEDICINE (OUTPATIENT)
Dept: NEUROLOGY | Age: 22
End: 2022-11-11
Payer: COMMERCIAL

## 2022-11-11 ENCOUNTER — CARE COORDINATION (OUTPATIENT)
Dept: OTHER | Facility: CLINIC | Age: 22
End: 2022-11-11

## 2022-11-11 DIAGNOSIS — G44.83 COUGH HEADACHE: Primary | ICD-10-CM

## 2022-11-11 PROCEDURE — 99214 OFFICE O/P EST MOD 30 MIN: CPT | Performed by: PSYCHIATRY & NEUROLOGY

## 2022-11-11 RX ORDER — ESCITALOPRAM OXALATE 10 MG/1
10 TABLET ORAL DAILY
COMMUNITY
End: 2022-11-30

## 2022-11-11 RX ORDER — HYDROCODONE BITARTRATE AND HOMATROPINE METHYLBROMIDE ORAL SOLUTION 5; 1.5 MG/5ML; MG/5ML
5 LIQUID ORAL 3 TIMES DAILY PRN
Qty: 105 ML | Refills: 0 | Status: SHIPPED | OUTPATIENT
Start: 2022-11-11 | End: 2022-11-18

## 2022-11-11 ASSESSMENT — ENCOUNTER SYMPTOMS: COUGH: 1

## 2022-11-11 NOTE — CARE COORDINATION
Ambulatory Care Coordination Note  11/11/2022    ACC: Bill Perdomo, RN    ACM attempted to reach patient for follow up call regarding ED visit (cough), discuss neurology follow up, medications filled, headache and cough symptoms, review all medications, and address any arising needs. HIPAA compliant message left requesting a return phone call at patients convenience. Will continue to follow.         Future Appointments   Date Time Provider Sulema Sifuentes   12/13/2022 10:30 AM Paul Ellington MD [facility]   4/6/2023  2:00 PM Andrés Cochran MD [facility] (2-RH) Neurology -

## 2022-11-11 NOTE — PROGRESS NOTES
Mark Jerez (:  2000) is a Established patient, here for evaluation of the following:    Assessment & Plan   Below is the assessment and plan developed based on review of pertinent history, physical exam, labs, studies, and medications. 1. Cough headache  -     HYDROcodone homatropine (HYCODAN) 5-1.5 MG/5ML solution; Take 5 mLs by mouth 3 times daily as needed (cough) for up to 7 days. , Disp-105 mL, R-0Normal  Cough induced moderate to severe headaches, MRI recently borderline Chiari, stable. Continue Topamax and Zanaflex, may increased Zanaflex night dosage. She is a good candidate for botox therapy. Return if symptoms worsen or fail to improve. Subjective   Flu for a week, coughing related headache. Went ER, got antiflu pill and robitussin. Daily HA posterior about 4 years pressure, migraine anterior 2 per month maxalt helped, coughing HA in posterior past week moderate to severe day and night, overall less but still there. On Topamax, Zanaflex and other psychotropic meds. No asthma or HTN, no palpitation or SOB reported, no fever. FINDINGS:     The FLAIR sequences are mildly degraded by motion, slightly limiting evaluation. Again seen is mild cerebellar tonsillar descent below the foramen magnum  measuring up to 5-6 mm. No new focal suspicious brain parenchymal signal  abnormality is identified. No cerebral or cerebellar mass. No evidence of acute  intracranial hemorrhage or acute infarct. No diffusion weighted signal  abnormality is seen. The ventricles are within normal limits in size and  configuration. The remaining midline structures, including the pituitary gland  and corpus callosum, are unremarkable. No extra-axial fluid collections. No  suspicious intrarenal postcontrast enhancement. Impression     Similar borderline Chiari I malformation. Otherwise unremarkable MRI of the  brain. Review of Systems   Respiratory:  Positive for cough.     Neurological: Positive for headaches. Objective   Patient-Reported Vitals  No data recorded     Physical Exam  [INSTRUCTIONS:  \"[x]\" Indicates a positive item  \"[]\" Indicates a negative item  -- DELETE ALL ITEMS NOT EXAMINED]    Constitutional: [x] Appears well-developed and well-nourished [x] No apparent distress      [] Abnormal -     Mental status: [x] Alert and awake  [x] Oriented to person/place/time [x] Able to follow commands, normal speech    [] Abnormal -     Eyes:   EOM    [x]  Normal    [] Abnormal -   Sclera  [x]  Normal    [] Abnormal -          Discharge [x]  None visible   [] Abnormal -     HENT: [x] Normocephalic, atraumatic  [] Abnormal -   [x]     External Ears [x] Normal hearing [] Abnormal -    Neck: [x] [] Abnormal -     Pulmonary/Chest: [x] Respiratory effort normal   [x] No visualized signs of difficulty breathing or respiratory distress        [] Abnormal -      Musculoskeletal:   [x] Normal gait with no signs of ataxia         [x] Normal range of motion of neck        [] Abnormal -     Neurological:        [x] No Facial Asymmetry (Cranial nerve 7 motor function) (limited exam due to video visit)          [x] No gaze palsy        [] Abnormal -          Skin:        [x]          [] Abnormal -            Psychiatric:       [x] Normal Affect [] Abnormal -        [x] No Hallucinations    Other pertinent observable physical exam findings:-         On this date 11/11/2022 I have spent 30 minutes reviewing previous notes, test results and face to face (virtual) with the patient discussing the diagnosis and importance of compliance with the treatment plan as well as documenting on the day of the visitMari Kovacs, was evaluated through a synchronous (real-time) audio-video encounter. The patient (or guardian if applicable) is aware that this is a billable service, which includes applicable co-pays. This Virtual Visit was conducted with patient's (and/or legal guardian's) consent.  The visit was conducted pursuant to the emergency declaration under the [facility], [facility] authority and the [employer] General Act. Patient identification was verified, and a caregiver was present when appropriate. The patient was located at Home: [address] [facility] [phone]. Provider was located at [place] [address] ([address] [place] [address]): [address].         --Mary Zavala MD

## 2022-11-16 ENCOUNTER — CARE COORDINATION (OUTPATIENT)
Dept: OTHER | Facility: CLINIC | Age: 22
End: 2022-11-16

## 2022-11-16 NOTE — CARE COORDINATION
Ambulatory Care Coordination Note  11/16/2022    ACC: Leslye June, RN    ACM attempted to reach patient for follow up call regarding ED visit (cough), discuss neurology follow up, medications filled, headache and cough symptoms, review all medications, and address any arising needs. Contacted pt at time desired but unable to reach. HIPAA compliant message left requesting a return phone call at patients convenience. Will continue to follow.        Future Appointments   Date Time Provider Sulema Sifuentes   12/13/2022 10:30 AM Parker Duke MD [facility]   4/6/2023  2:00 PM Mary Zavala MD [facility] (2-RH) Neurology -

## 2022-11-16 NOTE — CARE COORDINATION
Ambulatory Care Coordination Note  11/16/2022    ACC: Machelle Solomon, GARRETT    Pt answered and requested call back after 1500. ACM will reach back out after 1500.      Future Appointments   Date Time Provider Sulema Sifuentes   12/13/2022 10:30 AM Viridiana Carranza MD [facility]   4/6/2023  2:00 PM Romeo Pozo MD [facility] (2-RH) Neurology -

## 2022-11-25 ENCOUNTER — CARE COORDINATION (OUTPATIENT)
Dept: OTHER | Facility: CLINIC | Age: 22
End: 2022-11-25

## 2022-11-25 NOTE — LETTER
Dear Alejandra Zapata,     My name is Cathi Mart, Associate Care Manager for [address] [place] [address],[address], and I have been trying to reach you. The Associate Care Management (ACM) program is a free-of-charge, confidential service provided to our employees and their family members covered by the [facility]. I can help you with care transitions such as when you come home from the hospital, when help is needed to manage your disease, or when you need assistance coordinating services or appointments. As healthcare providers, we know that patients do better when they have close follow up with a primary care provider (PCP). I can help you find one that is convenient to you and covered by your insurance. I can also help you understand any after visit instructions, such as what symptoms to watch out for, or any new or changed medications. We can work together using your preferred communication -- telephone, email, MyChart. If you do not have a [employer] account, I can help you request access. Our program is designed to provide you with the opportunity to have a [facility] partner with you for your healthcare needs. Due to not being able to reach you, I am closing out the current program, but will remain available to you should you have any questions.      Please contact me at     Cathi Mart RN   Associate Care Management   Phone [phone]  Adrián@"[email or web address]

## 2022-11-25 NOTE — CARE COORDINATION
Ambulatory Care Coordination Note  11/25/2022    ACC: Bill Perdomo RN    ACM attempted third and final call to patient for Associate Care Management follow up. HIPAA compliant message left requesting a return phone call at patients convenience. Final Unable to Reach Letter sent via Mail. No further outreach scheduled with this ACM, ACM will sign off care team at this time. Patient has been provided with this ACM's contact information.        Future Appointments   Date Time Provider Sulema Sifuentes   12/13/2022 10:30 AM Paul Ellington MD [facility]   4/6/2023  2:00 PM Andrés Cochran MD [facility] (2-RH) Neurology -

## 2022-11-30 ENCOUNTER — OFFICE VISIT (OUTPATIENT)
Dept: OBGYN CLINIC | Age: 22
End: 2022-11-30
Payer: COMMERCIAL

## 2022-11-30 VITALS
HEIGHT: 66 IN | DIASTOLIC BLOOD PRESSURE: 78 MMHG | WEIGHT: 223 LBS | SYSTOLIC BLOOD PRESSURE: 122 MMHG | BODY MASS INDEX: 35.84 KG/M2

## 2022-11-30 DIAGNOSIS — Z30.41 SURVEILLANCE FOR BIRTH CONTROL, ORAL CONTRACEPTIVES: ICD-10-CM

## 2022-11-30 DIAGNOSIS — N76.4 ABSCESS OF RIGHT GENITAL LABIA: ICD-10-CM

## 2022-11-30 DIAGNOSIS — E28.2 PCOS (POLYCYSTIC OVARIAN SYNDROME): ICD-10-CM

## 2022-11-30 DIAGNOSIS — L73.2 HIDRADENITIS SUPPURATIVA: Primary | ICD-10-CM

## 2022-11-30 PROCEDURE — 99214 OFFICE O/P EST MOD 30 MIN: CPT | Performed by: OBSTETRICS & GYNECOLOGY

## 2022-11-30 RX ORDER — ESCITALOPRAM OXALATE 20 MG/1
TABLET ORAL
COMMUNITY
Start: 2022-11-22

## 2022-11-30 RX ORDER — HYDROCODONE BITARTRATE AND ACETAMINOPHEN 7.5; 325 MG/1; MG/1
TABLET ORAL
COMMUNITY
Start: 2022-11-28

## 2022-11-30 RX ORDER — AMOXICILLIN AND CLAVULANATE POTASSIUM 875; 125 MG/1; MG/1
TABLET, FILM COATED ORAL
COMMUNITY
Start: 2022-11-28

## 2022-11-30 NOTE — PROGRESS NOTES
[place] [address] [facility], [address]    Maria Guadalupe Arana MD, Karen Herron MD, FACOG  OBIE Stephenson-BC  Assessment/Plan     Lisa Beck was seen today for other. Diagnoses and all orders for this visit:    Hidradenitis suppurativa  Comments:  - she is currently taking oral minocycline and topical clinda/steroids by PCP  - would like referral to derm and Dr. NEV[facility] with gen surg, placed today    Orders:  -     External Referral to Dermatology  -     REHABILITATION [facility] Fernnado Childress MD, General Surgery, [facility]    Abscess of right genital labia  Comments:  - abscess sponteneously drained, continue current abx  - much improved from photo pt supplied  - if still persists after abx course, consider bactrim for mrsa coverage  - encouraged sitz baths, warm compresses  - barrier cream (vasoline/aquaphor) to help with raw surface    PCOS (polycystic ovarian syndrome)  Comments:  - on OCPs for cycle control  - consider adding metformin to help with acheiving weight loss and may also help with her HS. Surveillance for birth control, oral contraceptives  Comments:  - periods regular on OCPs         Subjective     Mark Dao [birth year] 25 y.o. Hussein Bazzi presents today for a gyn problem of right labial abscess. She has a h/o hidradenitis suppurativa that was diagnosed at age 10. She was seen by derm age 15 but not since. Currently being tx by PCP and has been on oral minocycline for years. She also reports using topical clindamycin cream and a steroid cream. She was seen by Dr. NEV[facility] with gen surg 3 years ago to evaluate for excision of axillary glands and at that time appearance wasn't severe enough to warrant surgery. She typically gets abscesses on her thigh/groin and the right axilla but has not had one in the labia before. Symptoms started last week, enlarging and becoming severely painful. Was the size of a softball per pt.  Sunday she took a bath and the area started draining and she was able to express some pus from the draining cyst. Seen by PCP on  and was given Augmentin Rx. Has not been doing soaks or compresses because skin feels raw and irritated. Pain was a 7/10 and currently 3-4/10. She was given norco by PCP for pain and has only been able to take at night due to sleepiness with the medicaiton. LMP: Patient's last menstrual period was 2022 (approximate).     BIRTH CONTROL: abstinence      OB History    Para Term  AB Living   0 0 0 0 0 0   SAB IAB Ectopic Molar Multiple Live Births   0 0 0 0 0 0           Past Medical History:   Diagnosis Date    Chiari I malformation ([facility] [address]     Hidradenitis     Imbalance 2/3/2022    Iron deficiency anemia     Major depressive disorder, single episode, unspecified     Migraine 2019    Migraines     Mild asthma     Unspecified hypothyroidism             Past Surgical History:   Procedure Laterality Date    OTHER SURGICAL HISTORY  2006    Oral surgery    TONSILLECTOMY      WISDOM TOOTH EXTRACTION  2016           Family History   Problem Relation Age of Onset    Depression Mother     Anxiety Disorder Mother     Diabetes type 2  Mother     Mental Illness Mother     Preeclampsia Mother     Depression Father     Anxiety Disorder Father     Mental Illness Father     Diabetes type 2  Maternal Grandmother     Diabetes Maternal Grandmother     Breast Cancer Neg Hx     Uterine Cancer Neg Hx     Colon Cancer Neg Hx     Ovarian Cancer Neg Hx            Social History     Socioeconomic History    Marital status: Single     Spouse name: Not on file    Number of children: Not on file    Years of education: Not on file    Highest education level: Not on file   Occupational History    Not on file   Tobacco Use    Smoking status: Never    Smokeless tobacco: Never   Substance and Sexual Activity    Alcohol use: No    Drug use: Never    Sexual activity: Never   Other Topics Concern    Not on file   Social History Narrative    Abuse: Feels safe at home, no history of physical abuse, no history of sexual abuse     Social Determinants of Health     Financial Resource Strain: Not on file   Food Insecurity: Not on file   Transportation Needs: Not on file   Physical Activity: Not on file   Stress: Not on file   Social Connections: Not on file   Intimate Partner Violence: Not on file   Housing Stability: Not on file           No Known Allergies      Review of Systems    Constitutional:  No fevers or chills    Neuro:  No headaches, seizure activity    HEENT: no visual changes, bleeding gums    CV: No chest pain or palpitations    Resp: No SOB or cough    Breast:  No masses, pain, D/C, bleeding    GI:  No nausea/vomiting/diarrhea/constipation    : No dysuria or hematuria    MS:  No back, joint pain    Gyn:  As per HPI    Psych:  No depression, anxiety      Objective       Vitals:    11/30/22 1327   BP: 122/78         Physical Exam    General:  well developed, well nourished, in no acute distress     Head:   normocephalic and atraumatic       Abdomen:  bowel sounds positive; abdomen soft and non-tender without masses, organomegaly, or hernias noted     Pelvic Exam:     Physical Exam  Genitourinary:                 External: normal female genitalia. Quarter-sized area of erythema on upper right labia. Some induration under erythematous area but no fluxtuance. No material draining with palpation. Scarring from prior lesions noted on groin area.            Msk:   no deformity or scoliosis noted with normal posture and gait     Skin:   intact without lesions or rashes     Psych:  alert and cooperative; normal mood and affect; normal attention span and concentration        Callei Buchanan MD   12:30 PM  12/01/22

## 2022-11-30 NOTE — PROGRESS NOTES
Pt comes in today for an abcess. Pt reports it is on her labia. Pt said it is draining and still in pain. LAST PAP:  08/31/2022 Negative     LAST MAMMO:  never     LMP:  Patient's last menstrual period was 11/16/2022 (approximate).     BIRTH CONTROL:  OCP (estrogen/progesterone)    TOBACCO USE:  No    FAMILY HISTORY OF:   Breast Cancer:  No   Ovarian Cancer:  No   Uterine Cancer:  No   Colon Cancer:  No    Vitals:    11/30/22 1327   BP: 122/78   Site: Left Upper Arm   Position: Sitting   Weight: 223 lb (101.2 kg)   Height: 5' 6\" (1.676 m)        Buffy Mae MA  11/30/22  1:37 PM

## 2022-12-01 ENCOUNTER — CARE COORDINATION (OUTPATIENT)
Dept: OTHER | Facility: CLINIC | Age: 22
End: 2022-12-01

## 2022-12-01 NOTE — CARE COORDINATION
12/1/22 STEPHENIES Sherrill assisting ACMSylvain, RN with outreach/UTR letter. Letter generated, printed, and mailed.

## 2023-01-05 DIAGNOSIS — G43.009 MIGRAINE WITHOUT AURA, NOT INTRACTABLE, WITHOUT STATUS MIGRAINOSUS: Primary | ICD-10-CM

## 2023-01-05 RX ORDER — TOPIRAMATE 50 MG/1
TABLET, FILM COATED ORAL
Qty: 180 TABLET | Refills: 2 | Status: SHIPPED | OUTPATIENT
Start: 2023-01-05

## 2023-01-19 ENCOUNTER — TELEPHONE (OUTPATIENT)
Dept: NEUROLOGY | Age: 23
End: 2023-01-19

## 2023-01-19 NOTE — TELEPHONE ENCOUNTER
Called patient about her medication interference with her [facility] but no answer and couldn't leave a VM

## 2023-04-06 ENCOUNTER — OFFICE VISIT (OUTPATIENT)
Dept: NEUROLOGY | Age: 23
End: 2023-04-06
Payer: COMMERCIAL

## 2023-04-06 VITALS
HEART RATE: 98 BPM | SYSTOLIC BLOOD PRESSURE: 138 MMHG | BODY MASS INDEX: 37.83 KG/M2 | DIASTOLIC BLOOD PRESSURE: 90 MMHG | WEIGHT: 234.4 LBS | OXYGEN SATURATION: 99 %

## 2023-04-06 DIAGNOSIS — G43.711 CHRONIC MIGRAINE WITHOUT AURA, WITH INTRACTABLE MIGRAINE, SO STATED, WITH STATUS MIGRAINOSUS: Primary | ICD-10-CM

## 2023-04-06 PROCEDURE — 99214 OFFICE O/P EST MOD 30 MIN: CPT | Performed by: PSYCHIATRY & NEUROLOGY

## 2023-04-06 RX ORDER — BUPROPION HYDROCHLORIDE 300 MG/1
TABLET ORAL
COMMUNITY
Start: 2023-03-28

## 2023-04-06 RX ORDER — METFORMIN HYDROCHLORIDE 750 MG/1
TABLET, EXTENDED RELEASE ORAL
COMMUNITY
Start: 2023-02-14

## 2023-04-06 RX ORDER — ERENUMAB-AOOE 70 MG/ML
70 INJECTION SUBCUTANEOUS
Qty: 3 ADJUSTABLE DOSE PRE-FILLED PEN SYRINGE | Refills: 4 | Status: SHIPPED | OUTPATIENT
Start: 2023-04-06

## 2023-04-06 ASSESSMENT — ENCOUNTER SYMPTOMS
CONSTIPATION: 0
DOUBLE VISION: 0
BACK PAIN: 0
BLURRED VISION: 0
PHOTOPHOBIA: 0

## 2023-04-06 NOTE — PROGRESS NOTES
MG per tablet TAKE 1 TABLET BY MOUTH IN THE MORNING AND 1 TABLET IN THE EVENING FOR 10 DAYS (Patient not taking: Reported on 4/6/2023)      HYDROcodone-acetaminophen (NORCO) 7.5-325 MG per tablet TAKE 1 TABLET BY MOUTH EVERY 6 HOURS AS NEEDED FOR MODERATE PAIN (SCORE 5-7) OR SEVERE PAIN (8-10) (Patient not taking: Reported on 4/6/2023)      oseltamivir (TAMIFLU) 30 MG capsule Take 30 mg by mouth 2 times daily (Patient not taking: Reported on 11/11/2022)      desogestrel-ethinyl estradiol (DESOGEN) 0.15-30 MG-MCG per tablet Take 1 tablet by mouth daily (Patient not taking: Reported on 4/6/2023) 1 packet 3    nystatin-triamcinolone (MYCOLOG II) 312248-5.1 UNIT/GM-% cream Apply topically 2 times daily x 1 week on the vulva (Patient not taking: Reported on 9/7/2022) 30 g 0    desvenlafaxine succinate (PRISTIQ) 25 MG TB24 extended release tablet 100 mg daily (Patient not taking: Reported on 11/30/2022)       No current facility-administered medications for this visit. No Known Allergies      Review of Systems:  Review of Systems   HENT:  Positive for hearing loss and tinnitus. Right ear hearing loss  Ringing in both ears   Eyes:  Negative for blurred vision, double vision and photophobia. Gastrointestinal:  Negative for constipation. Musculoskeletal:  Negative for back pain, falls, joint pain, myalgias and neck pain. Neurological:  Positive for dizziness (light headedness comes and goes.) and headaches (everyday). Negative for tingling, tremors, sensory change, speech change, focal weakness, seizures, loss of consciousness and weakness. Psychiatric/Behavioral:  Negative for depression, hallucinations, memory loss, substance abuse and suicidal ideas. The patient is not nervous/anxious and does not have insomnia. All other systems reviewed and are negative.        Examination:  Vitals:    04/06/23 1403   BP: (!) 138/90   Site: Left Upper Arm   Position: Sitting   Pulse: 98   SpO2: 99%   Weight: 234

## 2023-07-07 ENCOUNTER — HOSPITAL ENCOUNTER (OUTPATIENT)
Dept: ULTRASOUND IMAGING | Age: 23
Discharge: HOME | End: 2023-07-07
Payer: COMMERCIAL

## 2023-07-07 DIAGNOSIS — E03.9 HYPOTHYROIDISM, ACQUIRED: ICD-10-CM

## 2023-07-07 DIAGNOSIS — R13.10 DYSPHAGIA, UNSPECIFIED TYPE: ICD-10-CM

## 2023-07-07 PROCEDURE — 76536 US EXAM OF HEAD AND NECK: CPT

## 2023-11-20 DIAGNOSIS — G43.009 MIGRAINE WITHOUT AURA, NOT INTRACTABLE, WITHOUT STATUS MIGRAINOSUS: ICD-10-CM

## 2023-11-24 ENCOUNTER — TELEPHONE (OUTPATIENT)
Dept: NEUROLOGY | Age: 23
End: 2023-11-24

## 2023-11-24 NOTE — TELEPHONE ENCOUNTER
This is Dr. Waqas Dockery patient and was lat seen in April 223 with no scheduled follow up visit. Please advise.  Thanks [employer]

## 2023-11-24 NOTE — TELEPHONE ENCOUNTER
Shelby Durand called requesting a refill for Topiramate and rizatriptan. This patient was last seen by Dr. Haley Ramos in April 2023 with no scheduled future appointments. She stated that she has called multiple times and the pharmacy has called but to date no refills have been submitted to her pharmacy.  Thanks - Kelton Soria Discharged

## 2023-11-25 DIAGNOSIS — G43.009 MIGRAINE WITHOUT AURA, NOT INTRACTABLE, WITHOUT STATUS MIGRAINOSUS: ICD-10-CM

## 2023-11-27 DIAGNOSIS — G43.009 MIGRAINE WITHOUT AURA, NOT INTRACTABLE, WITHOUT STATUS MIGRAINOSUS: ICD-10-CM

## 2023-11-27 RX ORDER — TOPIRAMATE 50 MG/1
50 TABLET, FILM COATED ORAL 2 TIMES DAILY
Qty: 180 TABLET | Refills: 3 | Status: SHIPPED | OUTPATIENT
Start: 2023-11-27

## 2023-11-27 RX ORDER — TOPIRAMATE 50 MG/1
50 TABLET, FILM COATED ORAL 2 TIMES DAILY
Qty: 180 TABLET | Refills: 2 | OUTPATIENT
Start: 2023-11-27

## 2023-11-27 RX ORDER — RIZATRIPTAN BENZOATE 10 MG/1
TABLET ORAL
Qty: 10 TABLET | Refills: 5 | OUTPATIENT
Start: 2023-11-27

## 2023-11-27 RX ORDER — RIZATRIPTAN BENZOATE 10 MG/1
TABLET ORAL
Qty: 10 TABLET | Refills: 5 | Status: SHIPPED | OUTPATIENT
Start: 2023-11-27

## 2024-01-15 ENCOUNTER — CLINICAL DOCUMENTATION (OUTPATIENT)
Dept: NEUROLOGY | Age: 24
End: 2024-01-15

## 2024-01-17 ENCOUNTER — OFFICE VISIT (OUTPATIENT)
Dept: NEUROLOGY | Age: 24
End: 2024-01-17

## 2024-01-17 VITALS
DIASTOLIC BLOOD PRESSURE: 82 MMHG | WEIGHT: 238 LBS | SYSTOLIC BLOOD PRESSURE: 120 MMHG | HEART RATE: 94 BPM | BODY MASS INDEX: 38.41 KG/M2 | OXYGEN SATURATION: 98 %

## 2024-01-17 DIAGNOSIS — J45.909 MILD ASTHMA WITHOUT COMPLICATION, UNSPECIFIED WHETHER PERSISTENT: ICD-10-CM

## 2024-01-17 DIAGNOSIS — F32.9 MAJOR DEPRESSIVE DISORDER WITH SINGLE EPISODE, REMISSION STATUS UNSPECIFIED: ICD-10-CM

## 2024-01-17 DIAGNOSIS — R11.0 NAUSEA: ICD-10-CM

## 2024-01-17 DIAGNOSIS — E28.2 PCOS (POLYCYSTIC OVARIAN SYNDROME): ICD-10-CM

## 2024-01-17 DIAGNOSIS — G43.711 CHRONIC MIGRAINE WITHOUT AURA, WITH INTRACTABLE MIGRAINE, SO STATED, WITH STATUS MIGRAINOSUS: ICD-10-CM

## 2024-01-17 DIAGNOSIS — F90.9 ATTENTION DEFICIT HYPERACTIVITY DISORDER (ADHD), UNSPECIFIED ADHD TYPE: ICD-10-CM

## 2024-01-17 DIAGNOSIS — E03.9 HYPOTHYROIDISM, UNSPECIFIED TYPE: ICD-10-CM

## 2024-01-17 DIAGNOSIS — G44.209 TENSION-TYPE HEADACHE, NOT INTRACTABLE, UNSPECIFIED CHRONICITY PATTERN: Primary | ICD-10-CM

## 2024-01-17 DIAGNOSIS — R51.9 CHRONIC DAILY HEADACHE: ICD-10-CM

## 2024-01-17 DIAGNOSIS — R45.4 IRRITABILITY: ICD-10-CM

## 2024-01-17 DIAGNOSIS — G93.5 CHIARI I MALFORMATION (HCC): ICD-10-CM

## 2024-01-17 DIAGNOSIS — F41.9 ANXIETY: ICD-10-CM

## 2024-01-17 RX ORDER — RIMEGEPANT SULFATE 75 MG/75MG
75 TABLET, ORALLY DISINTEGRATING ORAL
Qty: 9 TABLET | Refills: 3 | Status: SHIPPED | OUTPATIENT
Start: 2024-01-17 | End: 2024-01-17

## 2024-01-17 RX ORDER — ERENUMAB-AOOE 70 MG/ML
70 INJECTION SUBCUTANEOUS
Qty: 3 ADJUSTABLE DOSE PRE-FILLED PEN SYRINGE | Refills: 4 | Status: SHIPPED | OUTPATIENT
Start: 2024-01-17

## 2024-01-17 RX ORDER — ONDANSETRON 4 MG/1
4 TABLET, FILM COATED ORAL EVERY 8 HOURS PRN
Qty: 30 TABLET | Refills: 0 | Status: SHIPPED | OUTPATIENT
Start: 2024-01-17

## 2024-01-17 RX ORDER — FAMOTIDINE 40 MG/1
40 TABLET, FILM COATED ORAL 2 TIMES DAILY
COMMUNITY
Start: 2023-11-20

## 2024-01-17 RX ORDER — LISDEXAMFETAMINE DIMESYLATE CAPSULES 30 MG/1
30 CAPSULE ORAL EVERY MORNING
COMMUNITY
Start: 2023-10-25

## 2024-01-17 ASSESSMENT — PATIENT HEALTH QUESTIONNAIRE - PHQ9
SUM OF ALL RESPONSES TO PHQ QUESTIONS 1-9: 0
2. FEELING DOWN, DEPRESSED OR HOPELESS: 0
SUM OF ALL RESPONSES TO PHQ QUESTIONS 1-9: 0
SUM OF ALL RESPONSES TO PHQ QUESTIONS 1-9: 0
SUM OF ALL RESPONSES TO PHQ9 QUESTIONS 1 & 2: 0
1. LITTLE INTEREST OR PLEASURE IN DOING THINGS: 0
SUM OF ALL RESPONSES TO PHQ QUESTIONS 1-9: 0

## 2024-01-17 NOTE — PROGRESS NOTES
remaining midline structures, including the pituitary gland  and corpus callosum, are unremarkable. No extra-axial fluid collections. No  suspicious intrarenal postcontrast enhancement.    Impression  Similar borderline Chiari I malformation. Otherwise unremarkable MRI of the  brain.       Previous Testing:   No results found for: \"WBC\", \"HGB\", \"HCT\", \"MCV\", \"PLT\"   No results found for: \"NA\", \"K\", \"CL\", \"CO2\", \"BUN\", \"CREATININE\", \"GLUCOSE\", \"CALCIUM\", \"LABGLOM\"        Review of Systems   Review of Systems   Constitutional:  Negative for chills and fever.   HENT:  Positive for hearing loss. Negative for sore throat.    Eyes:  Negative for pain and visual disturbance.   Respiratory:  Negative for cough and chest tightness.    Cardiovascular:  Negative for chest pain.   Gastrointestinal:  Negative for abdominal pain and constipation.   Endocrine: Positive for cold intolerance.   Genitourinary:  Negative for dysuria.   Musculoskeletal:  Positive for back pain.   Skin:  Negative for rash and wound.   Allergic/Immunologic: Negative for environmental allergies and food allergies.   Neurological:  Positive for dizziness, light-headedness and headaches.   Psychiatric/Behavioral:  Positive for dysphoric mood and sleep disturbance. Negative for agitation and hallucinations. The patient is nervous/anxious.          Past Medical History:  Past Medical History:   Diagnosis Date    Chiari I malformation (HCC)     Hidradenitis     Imbalance 2/3/2022    Iron deficiency anemia     Major depressive disorder, single episode, unspecified     Migraine 2019    Migraines     Mild asthma     Unspecified hypothyroidism      Past Surgical History:   Procedure Laterality Date    OTHER SURGICAL HISTORY  2006    Oral surgery    TONSILLECTOMY      WISDOM TOOTH EXTRACTION  2016     Family History   Problem Relation Age of Onset    Depression Mother     Anxiety Disorder Mother     Diabetes type 2  Mother     Mental Illness Mother     Preeclampsia

## 2024-03-18 ASSESSMENT — ENCOUNTER SYMPTOMS
CHEST TIGHTNESS: 0
CONSTIPATION: 0
SORE THROAT: 0
EYE PAIN: 0
COUGH: 0
BACK PAIN: 1
ABDOMINAL PAIN: 0

## 2024-03-18 NOTE — PROGRESS NOTES
into the lungs every 4 hours as needed      levothyroxine (SYNTHROID) 112 MCG tablet TAKE 1 TAB BY MOUTH DAILY (BEFORE BREAKFAST).      minocycline (MINOCIN;DYNACIN) 50 MG capsule Take 1 capsule by mouth 2 times daily      nystatin-triamcinolone (MYCOLOG II) 757770-4.1 UNIT/GM-% cream Apply topically 2 times daily x 1 week on the vulva (Patient not taking: Reported on 9/7/2022) 30 g 0     No current facility-administered medications for this visit.       Allergies:  No Known Allergies    Social History:  Social History     Socioeconomic History    Marital status: Single     Spouse name: None    Number of children: None    Years of education: None    Highest education level: None   Tobacco Use    Smoking status: Never    Smokeless tobacco: Never   Substance and Sexual Activity    Alcohol use: No    Drug use: Never    Sexual activity: Never   Social History Narrative    Abuse: Feels safe at home, no history of physical abuse, no history of sexual abuse       Family History:  Family History   Problem Relation Age of Onset    Depression Mother     Anxiety Disorder Mother     Diabetes type 2  Mother     Mental Illness Mother     Preeclampsia Mother     Migraines Mother     Neuropathy Mother     Depression Father     Anxiety Disorder Father     Mental Illness Father     Diabetes type 2  Maternal Grandmother     Diabetes Maternal Grandmother     Memory Loss Maternal Grandmother     Neuropathy Maternal Grandmother     Neuropathy Paternal Grandfather     Stroke Paternal Grandfather     Breast Cancer Neg Hx     Uterine Cancer Neg Hx     Colon Cancer Neg Hx     Ovarian Cancer Neg Hx        Vital Signs:  Vitals:    03/19/24 1406   BP: 115/80   Site: Left Upper Arm   Position: Sitting   Pulse: (!) 112   SpO2: 99%   Weight: 100.5 kg (221 lb 9.6 oz)   Height: 1.676 m (5' 6\")      Body mass index is 35.77 kg/m².       Physical Exam:  General:  No acute distress.  Head & Neck: within normal limits, palpation: non-tender occiput,

## 2024-03-19 ENCOUNTER — OFFICE VISIT (OUTPATIENT)
Dept: NEUROLOGY | Age: 24
End: 2024-03-19
Payer: COMMERCIAL

## 2024-03-19 VITALS
BODY MASS INDEX: 35.62 KG/M2 | SYSTOLIC BLOOD PRESSURE: 115 MMHG | OXYGEN SATURATION: 99 % | HEIGHT: 66 IN | DIASTOLIC BLOOD PRESSURE: 80 MMHG | HEART RATE: 112 BPM | WEIGHT: 221.6 LBS

## 2024-03-19 DIAGNOSIS — F41.1 GENERALIZED ANXIETY DISORDER: ICD-10-CM

## 2024-03-19 DIAGNOSIS — F32.9 MAJOR DEPRESSIVE DISORDER WITH SINGLE EPISODE, REMISSION STATUS UNSPECIFIED: ICD-10-CM

## 2024-03-19 DIAGNOSIS — G44.209 TENSION-TYPE HEADACHE, NOT INTRACTABLE, UNSPECIFIED CHRONICITY PATTERN: ICD-10-CM

## 2024-03-19 DIAGNOSIS — E28.2 PCOS (POLYCYSTIC OVARIAN SYNDROME): ICD-10-CM

## 2024-03-19 DIAGNOSIS — R11.0 NAUSEA: ICD-10-CM

## 2024-03-19 DIAGNOSIS — F90.9 ATTENTION DEFICIT HYPERACTIVITY DISORDER (ADHD), UNSPECIFIED ADHD TYPE: ICD-10-CM

## 2024-03-19 DIAGNOSIS — D50.9 IRON DEFICIENCY ANEMIA, UNSPECIFIED IRON DEFICIENCY ANEMIA TYPE: ICD-10-CM

## 2024-03-19 DIAGNOSIS — G43.009 MIGRAINE WITHOUT AURA AND WITHOUT STATUS MIGRAINOSUS, NOT INTRACTABLE: Primary | ICD-10-CM

## 2024-03-19 DIAGNOSIS — R79.89 LOW THYROID STIMULATING HORMONE (TSH) LEVEL: ICD-10-CM

## 2024-03-19 DIAGNOSIS — J45.909 ASTHMA, UNSPECIFIED ASTHMA SEVERITY, UNSPECIFIED WHETHER COMPLICATED, UNSPECIFIED WHETHER PERSISTENT: ICD-10-CM

## 2024-03-19 DIAGNOSIS — E06.3 HASHIMOTO'S DISEASE: ICD-10-CM

## 2024-03-19 DIAGNOSIS — E03.9 HYPOTHYROIDISM, UNSPECIFIED TYPE: ICD-10-CM

## 2024-03-19 DIAGNOSIS — G93.5 CHIARI I MALFORMATION (HCC): ICD-10-CM

## 2024-03-19 PROBLEM — K21.9 GASTROESOPHAGEAL REFLUX DISEASE: Status: ACTIVE | Noted: 2019-04-10

## 2024-03-19 PROCEDURE — 99215 OFFICE O/P EST HI 40 MIN: CPT

## 2024-03-19 RX ORDER — LISDEXAMFETAMINE DIMESYLATE CAPSULES 50 MG/1
50 CAPSULE ORAL EVERY MORNING
COMMUNITY

## 2024-03-19 RX ORDER — OMEPRAZOLE 40 MG/1
CAPSULE, DELAYED RELEASE ORAL
COMMUNITY
Start: 2024-02-23

## 2024-03-19 ASSESSMENT — PATIENT HEALTH QUESTIONNAIRE - PHQ9
SUM OF ALL RESPONSES TO PHQ9 QUESTIONS 1 & 2: 0
1. LITTLE INTEREST OR PLEASURE IN DOING THINGS: NOT AT ALL
SUM OF ALL RESPONSES TO PHQ QUESTIONS 1-9: 0
2. FEELING DOWN, DEPRESSED OR HOPELESS: NOT AT ALL

## 2024-12-06 DIAGNOSIS — G43.009 MIGRAINE WITHOUT AURA, NOT INTRACTABLE, WITHOUT STATUS MIGRAINOSUS: ICD-10-CM

## 2024-12-06 RX ORDER — TOPIRAMATE 50 MG/1
50 TABLET, FILM COATED ORAL 2 TIMES DAILY
Qty: 180 TABLET | Refills: 3 | OUTPATIENT
Start: 2024-12-06

## 2025-04-24 NOTE — LETTER
Monitor: The problem is stable.  Evaluation: Labs/tests ordered, see encounter summary.  Assessment/Treatment:  Continue current treatment/monitoring regimen.   Ms. Zach Brown [id number] [facility] Dear Desiree Rolle, My name is Roro Steel RN, Employee Care Manager for [facility] and I have been trying to reach you. The Employee Care Management [facility]) program is a free-of-charge confidential service provided to our employees and their family members covered by the [facility]. The program will provide an employee and his/her family with the [facility] expertise to assist in navigating the health care delivery system, provider services, and their overall care needsso as to assure and improve health care interactions and enhance the quality of life. This program is designed to provide you with the opportunity to have a [facility] care manager partner with you for the following services: 
 
 1) when you come home from the hospital or emergency room 2) when help is needed to manage your disease 3) when you need assistance coordinating services or appointments [facility] now partners with [facility]. If you are a qualifying employee, you may receive an additional 10 wellness incentive points for every month of active participation with an Employee Care Manager. [facility] is dedicated to empowering the good health of its community and improving the quality of care and care experiences for employees and their families. We are committed to safeguarding patient confidentiality and privacy, assuring that every employee has the respect he or she deserves in managing their health. The information shared with your care manager will not be shared with anyone else aside from those you identify as part of your care team, and will only be used to assist you with any identified care needs. Please contact me if you would like this service provided to you. Sincerely, Roro Steel RN, BSN  Employee Care Manager [address] [email or web address]